# Patient Record
Sex: MALE | URBAN - METROPOLITAN AREA
[De-identification: names, ages, dates, MRNs, and addresses within clinical notes are randomized per-mention and may not be internally consistent; named-entity substitution may affect disease eponyms.]

---

## 2018-09-07 ASSESSMENT — ENCOUNTER SYMPTOMS
BACK PAIN: 1
ARTHRALGIAS: 1
CONSTIPATION: 1
HEARTBURN: 1

## 2018-09-07 ASSESSMENT — ANXIETY QUESTIONNAIRES
7. FEELING AFRAID AS IF SOMETHING AWFUL MIGHT HAPPEN: NOT AT ALL
6. BECOMING EASILY ANNOYED OR IRRITABLE: NOT AT ALL
GAD7 TOTAL SCORE: 3
4. TROUBLE RELAXING: SEVERAL DAYS
GAD7 TOTAL SCORE: 3
GAD7 TOTAL SCORE: 3
1. FEELING NERVOUS, ANXIOUS, OR ON EDGE: SEVERAL DAYS
2. NOT BEING ABLE TO STOP OR CONTROL WORRYING: NOT AT ALL
7. FEELING AFRAID AS IF SOMETHING AWFUL MIGHT HAPPEN: NOT AT ALL
5. BEING SO RESTLESS THAT IT IS HARD TO SIT STILL: NOT AT ALL
3. WORRYING TOO MUCH ABOUT DIFFERENT THINGS: SEVERAL DAYS

## 2018-09-07 ASSESSMENT — PATIENT HEALTH QUESTIONNAIRE - PHQ9
10. IF YOU CHECKED OFF ANY PROBLEMS, HOW DIFFICULT HAVE THESE PROBLEMS MADE IT FOR YOU TO DO YOUR WORK, TAKE CARE OF THINGS AT HOME, OR GET ALONG WITH OTHER PEOPLE: NOT DIFFICULT AT ALL
SUM OF ALL RESPONSES TO PHQ QUESTIONS 1-9: 4
SUM OF ALL RESPONSES TO PHQ QUESTIONS 1-9: 4

## 2018-09-08 ASSESSMENT — PATIENT HEALTH QUESTIONNAIRE - PHQ9: SUM OF ALL RESPONSES TO PHQ QUESTIONS 1-9: 4

## 2018-09-08 ASSESSMENT — ANXIETY QUESTIONNAIRES: GAD7 TOTAL SCORE: 3

## 2018-09-11 ENCOUNTER — RADIANT APPOINTMENT (OUTPATIENT)
Dept: BONE DENSITY | Facility: CLINIC | Age: 43
End: 2018-09-11
Payer: COMMERCIAL

## 2018-09-11 ENCOUNTER — APPOINTMENT (OUTPATIENT)
Dept: INTERNAL MEDICINE | Facility: CLINIC | Age: 43
End: 2018-09-11
Payer: COMMERCIAL

## 2018-09-11 ENCOUNTER — OFFICE VISIT (OUTPATIENT)
Dept: OPHTHALMOLOGY | Facility: CLINIC | Age: 43
End: 2018-09-11
Payer: COMMERCIAL

## 2018-09-11 ENCOUNTER — OFFICE VISIT (OUTPATIENT)
Dept: GASTROENTEROLOGY | Facility: CLINIC | Age: 43
End: 2018-09-11
Payer: COMMERCIAL

## 2018-09-11 ENCOUNTER — OFFICE VISIT (OUTPATIENT)
Dept: AUDIOLOGY | Facility: CLINIC | Age: 43
End: 2018-09-11
Payer: COMMERCIAL

## 2018-09-11 ENCOUNTER — OFFICE VISIT (OUTPATIENT)
Dept: DERMATOLOGY | Facility: CLINIC | Age: 43
End: 2018-09-11
Payer: COMMERCIAL

## 2018-09-11 ENCOUNTER — OFFICE VISIT (OUTPATIENT)
Dept: INTERNAL MEDICINE | Facility: CLINIC | Age: 43
End: 2018-09-11
Payer: COMMERCIAL

## 2018-09-11 VITALS
BODY MASS INDEX: 23.41 KG/M2 | HEIGHT: 74 IN | DIASTOLIC BLOOD PRESSURE: 72 MMHG | SYSTOLIC BLOOD PRESSURE: 108 MMHG | OXYGEN SATURATION: 98 % | TEMPERATURE: 97.5 F | RESPIRATION RATE: 16 BRPM | HEART RATE: 71 BPM | WEIGHT: 182.4 LBS

## 2018-09-11 DIAGNOSIS — D22.9 BENIGN NEVUS OF SKIN: ICD-10-CM

## 2018-09-11 DIAGNOSIS — K21.9 GASTROESOPHAGEAL REFLUX DISEASE WITHOUT ESOPHAGITIS: ICD-10-CM

## 2018-09-11 DIAGNOSIS — Z71.3 NUTRITIONAL COUNSELING: Primary | ICD-10-CM

## 2018-09-11 DIAGNOSIS — Z00.00 VISIT FOR PREVENTIVE HEALTH EXAMINATION: Primary | ICD-10-CM

## 2018-09-11 DIAGNOSIS — Z00.00 ENCOUNTER FOR PREVENTIVE HEALTH EXAMINATION: ICD-10-CM

## 2018-09-11 DIAGNOSIS — Z01.10 EXAMINATION OF EARS AND HEARING: Primary | ICD-10-CM

## 2018-09-11 DIAGNOSIS — M85.9 LOW BONE DENSITY: ICD-10-CM

## 2018-09-11 DIAGNOSIS — Z23 NEED FOR TD VACCINE: ICD-10-CM

## 2018-09-11 DIAGNOSIS — Z98.890 HISTORY OF PHOTOREFRACTIVE KERATECTOMY: Primary | ICD-10-CM

## 2018-09-11 DIAGNOSIS — M54.5 LOW BACK PAIN, UNSPECIFIED BACK PAIN LATERALITY, UNSPECIFIED CHRONICITY, WITH SCIATICA PRESENCE UNSPECIFIED: ICD-10-CM

## 2018-09-11 DIAGNOSIS — E78.5 HYPERLIPIDEMIA, UNSPECIFIED HYPERLIPIDEMIA TYPE: ICD-10-CM

## 2018-09-11 DIAGNOSIS — Z00.00 VISIT FOR PREVENTIVE HEALTH EXAMINATION: ICD-10-CM

## 2018-09-11 DIAGNOSIS — Z00.00 ROUTINE GENERAL MEDICAL EXAMINATION AT A HEALTH CARE FACILITY: Primary | ICD-10-CM

## 2018-09-11 DIAGNOSIS — L91.8 ST (SKIN TAG): ICD-10-CM

## 2018-09-11 DIAGNOSIS — M25.511 RIGHT SHOULDER PAIN, UNSPECIFIED CHRONICITY: ICD-10-CM

## 2018-09-11 DIAGNOSIS — Z71.82 EXERCISE COUNSELING: Primary | ICD-10-CM

## 2018-09-11 DIAGNOSIS — D22.9 MULTIPLE BENIGN NEVI: Primary | ICD-10-CM

## 2018-09-11 LAB
ALBUMIN UR-MCNC: NEGATIVE MG/DL
ALP SERPL-CCNC: 91 U/L (ref 40–150)
ALT SERPL W P-5'-P-CCNC: 22 U/L (ref 0–70)
APPEARANCE UR: CLEAR
BACTERIA #/AREA URNS HPF: ABNORMAL /HPF
BASOPHILS # BLD AUTO: 0 10E9/L (ref 0–0.2)
BASOPHILS NFR BLD AUTO: 0.4 %
BILIRUB UR QL STRIP: NEGATIVE
CALCIUM SERPL-MCNC: 8.8 MG/DL (ref 8.5–10.1)
CHOLEST SERPL-MCNC: 153 MG/DL
COLOR UR AUTO: YELLOW
CREAT SERPL-MCNC: 1.01 MG/DL (ref 0.66–1.25)
DEPRECATED CALCIDIOL+CALCIFEROL SERPL-MC: 35 UG/L (ref 20–75)
DIFFERENTIAL METHOD BLD: NORMAL
EOSINOPHIL # BLD AUTO: 0.2 10E9/L (ref 0–0.7)
EOSINOPHIL NFR BLD AUTO: 3 %
ERYTHROCYTE [DISTWIDTH] IN BLOOD BY AUTOMATED COUNT: 12 % (ref 10–15)
EXPTIME-PRE: 6.22 SEC
FEF2575-%PRED-PRE: 95 %
FEF2575-PRE: 3.93 L/SEC
FEF2575-PRED: 4.1 L/SEC
FEFMAX-%PRED-PRE: 97 %
FEFMAX-PRE: 10.78 L/SEC
FEFMAX-PRED: 11.01 L/SEC
FEV1-%PRED-PRE: 108 %
FEV1-PRE: 4.7 L
FEV1FEV6-PRE: 77 %
FEV1FEV6-PRED: 81 %
FEV1FVC-PRE: 77 %
FEV1FVC-PRED: 79 %
FIFMAX-PRE: 8.56 L/SEC
FVC-%PRED-PRE: 111 %
FVC-PRE: 6.07 L
FVC-PRED: 5.45 L
GFR SERPL CREATININE-BSD FRML MDRD: 80 ML/MIN/1.7M2
GLUCOSE SERPL-MCNC: 93 MG/DL (ref 70–99)
GLUCOSE UR STRIP-MCNC: NEGATIVE MG/DL
HCT VFR BLD AUTO: 44.9 % (ref 40–53)
HDLC SERPL-MCNC: 35 MG/DL
HGB BLD-MCNC: 15.1 G/DL (ref 13.3–17.7)
HGB UR QL STRIP: NEGATIVE
HIV 1+2 AB+HIV1 P24 AG SERPL QL IA: NONREACTIVE
IMM GRANULOCYTES # BLD: 0 10E9/L (ref 0–0.4)
IMM GRANULOCYTES NFR BLD: 0.2 %
KETONES UR STRIP-MCNC: NEGATIVE MG/DL
LDLC SERPL CALC-MCNC: 90 MG/DL
LEUKOCYTE ESTERASE UR QL STRIP: NEGATIVE
LYMPHOCYTES # BLD AUTO: 1.6 10E9/L (ref 0.8–5.3)
LYMPHOCYTES NFR BLD AUTO: 30.5 %
MCH RBC QN AUTO: 29.4 PG (ref 26.5–33)
MCHC RBC AUTO-ENTMCNC: 33.6 G/DL (ref 31.5–36.5)
MCV RBC AUTO: 87 FL (ref 78–100)
MONOCYTES # BLD AUTO: 0.3 10E9/L (ref 0–1.3)
MONOCYTES NFR BLD AUTO: 6.1 %
MUCOUS THREADS #/AREA URNS LPF: PRESENT /LPF
NEUTROPHILS # BLD AUTO: 3.2 10E9/L (ref 1.6–8.3)
NEUTROPHILS NFR BLD AUTO: 59.8 %
NITRATE UR QL: NEGATIVE
NONHDLC SERPL-MCNC: 118 MG/DL
NRBC # BLD AUTO: 0 10*3/UL
NRBC BLD AUTO-RTO: 0 /100
PH UR STRIP: 5 PH (ref 5–7)
PLATELET # BLD AUTO: 161 10E9/L (ref 150–450)
PSA SERPL-ACNC: 0.96 UG/L (ref 0–4)
RBC # BLD AUTO: 5.14 10E12/L (ref 4.4–5.9)
RBC #/AREA URNS AUTO: <1 /HPF (ref 0–2)
SOURCE: ABNORMAL
SP GR UR STRIP: 1.02 (ref 1–1.03)
SQUAMOUS #/AREA URNS AUTO: <1 /HPF (ref 0–1)
TRIGL SERPL-MCNC: 139 MG/DL
TSH SERPL DL<=0.005 MIU/L-ACNC: 1.23 MU/L (ref 0.4–4)
UROBILINOGEN UR STRIP-MCNC: 0 MG/DL (ref 0–2)
WBC # BLD AUTO: 5.4 10E9/L (ref 4–11)
WBC #/AREA URNS AUTO: 1 /HPF (ref 0–5)

## 2018-09-11 ASSESSMENT — REFRACTION_MANIFEST
OS_CYLINDER: +0.25
OD_SPHERE: PLANO
OS_SPHERE: +0.50
OS_ADD: +1.00
OS_CYLINDER: SPHERE
OD_ADD: +1.00
OD_CYLINDER: SPHERE
OS_SPHERE: PLANO
OS_AXIS: 180
OD_SPHERE: PLANO
OD_CYLINDER: SPHERE

## 2018-09-11 ASSESSMENT — PAIN SCALES - GENERAL
PAINLEVEL: NO PAIN (0)
PAINLEVEL: NO PAIN (0)

## 2018-09-11 ASSESSMENT — VISUAL ACUITY
OD_SC: 20/20
METHOD_MR_RETINOSCOPY: 1
METHOD: SNELLEN - LINEAR
OS_SC: 20/20

## 2018-09-11 ASSESSMENT — CONF VISUAL FIELD
OS_NORMAL: 1
OD_NORMAL: 1

## 2018-09-11 ASSESSMENT — TONOMETRY
OS_IOP_MMHG: 10
OD_IOP_MMHG: 12
IOP_METHOD: ICARE

## 2018-09-11 ASSESSMENT — CUP TO DISC RATIO
OD_RATIO: 0.35
OS_RATIO: 0.4

## 2018-09-11 ASSESSMENT — SLIT LAMP EXAM - LIDS
COMMENTS: NORMAL
COMMENTS: NORMAL

## 2018-09-11 ASSESSMENT — EXTERNAL EXAM - RIGHT EYE: OD_EXAM: NORMAL

## 2018-09-11 ASSESSMENT — EXTERNAL EXAM - LEFT EYE: OS_EXAM: NORMAL

## 2018-09-11 NOTE — LETTER
Date:September 12, 2018      Patient was self referred, no letter generated. Do not send.        [facility] Information

## 2018-09-11 NOTE — NURSING NOTE
Chief Complaint   Patient presents with     Physical     Patient is here for annual physical     Maite Doty CMA 6:55 AM on 9/11/2018.      AHA BP    1st   112/74  2nd   112/77  3rd    108/72  Average  111/75  Maite Doty CMA 7:15 AM on 9/11/2018.

## 2018-09-11 NOTE — NURSING NOTE
Dermatology Rooming Note    Jj Breen's goals for this visit include:   Chief Complaint   Patient presents with     Skin Check     Jj is here today for a skin check- has some spots of concern.      Emy Siegel MA

## 2018-09-11 NOTE — PROGRESS NOTES
Physical Fitness Assessment:    See Fitness Action Plan for information. I will follow up with Jj about resistance training exercises.    Gilmar Garcia, PhD  Exercise Physiologist  Fitness

## 2018-09-11 NOTE — LETTER
"9/11/2018      RE: Jj Breen  [address] [facility] [address]        History and Physical Examination     SUBJECTIVE: Chief complaint: preventive health review.     Past Medical History:  1.  Allergic rhinitis  2.  GERD, with reported history of Vazquez's, resolved on subsequent EGDs  3.  Dyslipidemia  4.  Status post bilateral PRK eye surgery, 3/2011  5.  Status post right knee arthroscopic partial meniscectomy, 7/2013  6.  Status post left knee arthroscopic partial meniscectomy, 1/2015  7.  Status post right knee synthetic cartilage implant, 9/2017  8.  Intermittent lower back pain, with history of L5 disc \"bulge\" on MRI     Adverse Drug Reactions: None.     Current Medications:  Fluticasone, 1 spray to each nostril daily as needed  Omega-3 supplement, one capsule daily  Daily multivitamin supplement  Esomeprazole, 40 mg daily  Cetirizine, 10 mg daily as needed    Habits:  Tobacco: Never  Alcohol: None  Caffeine: 1 serving of Advocare (caffeine plus vitamin supplement) daily     Social History:  to Sarah and father of 2 daughters: Rubi, age 16, a physically active high school sophomore who enjoys motocross and downhill skiing, and Ramila, age 14, an  who enjoys volleyball and downhill skiing.  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  He enjoys family activities, and historically enjoyed biking, downhill skiing, walking, camping and hiking; since his knee surgery approximately one year ago, his activity levels have dropped significantly, due in part to a busy work travel schedule.  Currently he walks for 20 minutes, twice per week.    Family History: Mother is 68, with stable multiple sclerosis, diagnosed in her 30s.  Father is 68, with history of testicular cancer, successfully treated in his 30s, and prostate cancer, diagnosed in his early 60s.  A brother is in good " "health at age 45.  Daughters are in good health.  Maternal grandmother  in her 80s, presumably from an embolic stroke related to atrial fibrillation; she was a breast cancer survivor.  Maternal grandfather  in his 80s from lung and stomach cancer, with history of extensive tobacco use.  Paternal grandmother  in her 80s from lung cancer, with history of extensive secondhand smoke exposure.  Paternal grandfather  in his 60s from tobacco-related lung cancer.     Review of Systems: Persistent right knee pain, slowly improving over the past year following cartilage implant procedure.  Recent right greater than left shoulder discomfort, initially after beginning an exercise regimen that included shoulder \"dips\"; symptoms currently do not limit his activity.  Intermittent lower back and right lower extremity discomfort, recently described as right groin paresthesias, reviewed by prolonged periods of standing or sitting and resolved with change in position.  Symptoms seemed to improve with chiropractic maneuvers.  He denies unexplained fever, weight loss, leg weakness, difficulty with urination, and unrelenting nocturnal pain.  Known history of L5 disc \"bulge\".  Long-standing mild transient lightheadedness exclusively after rising quickly from bed; no history of palpitations, syncope, or associated symptoms.  Mild to moderate work-related stress, managed conservatively using techniques learned in a recent week-long work seminar. Rare episodes of heartburn, exclusively related to dietary indiscretion.  He reports that an endoscopy in spring 2017 was\" negative\"; 5-year follow-up was recommended at that time.  Most recent tetanus (Tdap) was administered in 2009.  No history of colonoscopy.  Remainder of complete review of systems was negative.    OBJECTIVE:     Vital signs: Height 74.25 inches.  Weight 182.5 pounds.  Blood pressure 111/75 on average of 3 automated readings.  Heart rate 71.  Respiratory rate " 16.  Temperature 97.5 degrees.  O2 saturation 98% on room air.  General: Alert, neatly dressed and groomed, in no acute distress.  HEENT: Atraumatic and normocephalic. Eyelids, pupils, and conjunctivae appeared normal. Lips, teeth and gums appear normal.  Oropharynx showed moist mucous membranes, without exudate or erythema.  Neck: Supple, without thyromegaly, mass, or bruit. No cervical or supraclavicular lymphadenopathy.  Back: No spinal or costovertebral angle tenderness.  SLR was negative to 60  bilaterally.  Lower extremity sensory and motor examinations were normal.  Heel and toe walks were performed without difficulty.  Chest: Clear to auscultation and percussion. Normal respiratory effort.  Cardiovascular: No jugular venous distention. Regular rate and rhythm, normal S1, S2 without murmur.  Abdomen: Bowel sounds positive; soft, nontender, without rebound, guarding, hepatosplenomegaly or mass.  Extremities: No cyanosis or edema.  Examination of the left shoulder was normal.  Examination of the right shoulder showed normal range of motion, with no clavicle, AC joint, or bicipital groove tenderness.  Full active abduction performed without pain.  Pain was elicited in the region of the supraspinatus tendon with external rotation and adduction.  Normal rotator cuff strength.  Genitalia: Normal male genitalia, without scrotal mass or hernia. No inguinal lymphadenopathy.  Rectal: Normal tone, with smooth, nontender, minimally enlarged prostate. No rectal mass.  Skin: Examination was deferred; full evaluation was completed earlier in day through dermatology clinic.  Neurologic: Cranial nerves II-XII were grossly intact. Sensory and motor examinations were normal. Normal gait.  Mini-Cog score was 5/5.  Psychiatric: Alert and oriented ×3. Normal affect. Judgment and insight intact.    Creatinine 1.01, calcium 8.8, alkaline phosphatase 91, ALT 22, cholesterol 153, HDL 35, LDL 90, triglycerides 139, cholesterol/HDL 4.3,  "PSA 0.96, TSH 1.23, 25-hydroxy vitamin D 35, glucose 93, white blood cell count 5400, hemoglobin 15.1, platelets 161,000 HIV nonreactive, urinalysis unremarkable.  Testosterone level pending.    EKG was unremarkable.  Spirometry showed an FEV1 of 4.70, with an FVC of 6.07; readings were 108% and 111% of predicted values, respectively.    DEXA showed normal bone density, with most negative and valid Z-score of -0.9 at the level of the right total hip.  Body composition analysis showed 27.0% fat (85th percentile); body mass index was 23.4.     ASSESSMENT:    1.  GERD.  Rare breakthrough symptoms with use of use of esomeprazole.  We reviewed the importance of pursuing further evaluation in the event of progressive symptoms.  I recommended regular GI follow-up even if recommendation is that EGD will not be due until 2022.    2.  Lower back pain.  Intermittent lower extremity symptoms, typically triggered by prolonged periods of standing or sitting.  He denies \"warning symptoms\", which we reviewed in detail.  Symptoms have been stable and have not interfered with usual activities.  He agrees to pursue further evaluation in the event of progressive discomfort, or if any \"warning symptoms\" are noted.    3.  Shoulder discomfort.  Current left shoulder examination is unremarkable.  Right shoulder examination is suggestive of mild supraspinatus tendinitis.  No evidence of rotator cuff tear or nerve impingement.  He will engage in home range of motion exercises and consider physical therapy evaluation and treatment if symptoms do not subside over the next month.    4.  Dyslipidemia.  HDL has fallen to 35 from previous levels as high as 42.  I suspect that his reduced level of activity have played a role.  We discussed the importance of regular exercise, maintenance of ideal weight, and the avoidance of \"trans-\" fats and inclusion of monounsaturated and omega-3 fats in his diet.  Current estimated 10-year risk of a vascular " "event is 1.2% using AHA/ACC algorithm from 2013 guidelines.    5.  Preventive care.  Tetanus (Td) vaccination was provided at the time of his appointment, bringing his immunization status up-to-date.  He is practicing recently-acquired stress management techniques with success.  We discussed the potential effect of omega-3 supplements on prostate tissue.  I recommended daily use of a 2000-international unit vitamin D3 supplement, along with daily calcium intake of 1200 mg, preferably from dietary sources.  He was advised to use only a product that does not contain iron should he choose to continue using a multivitamin supplement.  We discussed the importance of additional activity and exercise and strategies to incorporate activity into his disease schedule.  We reviewed the elements of a healthful diet, focusing on avoiding sugars and starches, saturated and \"trans-\" fats, while increasing his intake of monounsaturated and omega-3 fats.  He was advised to maintain ideal weight, while having to increase muscle mass and reduce body fat percentage.    PLAN: See above.     Please note that the above medical document was created with use of speech recognition software and may contain typographical errors.      Darwin Murcia MD      "

## 2018-09-11 NOTE — LETTER
"9/11/2018       RE: Jj Breen  [address] [facility] [address]     Dear Colleague,    Thank you for referring your patient, Jj Breen, to the [facility] at [facility]. Please see a copy of my visit note below.     History and Physical Examination     SUBJECTIVE: Chief complaint: preventive health review.     Past Medical History:  1.  Allergic rhinitis  2.  GERD, with reported history of Vazquez's, resolved on subsequent EGDs  3.  Dyslipidemia  4.  Status post bilateral PRK eye surgery, 3/2011  5.  Status post right knee arthroscopic partial meniscectomy, 7/2013  6.  Status post left knee arthroscopic partial meniscectomy, 1/2015  7.  Status post right knee synthetic cartilage implant, 9/2017  8.  Intermittent lower back pain, with history of L5 disc \"bulge\" on MRI     Adverse Drug Reactions: None.     Current Medications:  Fluticasone, 1 spray to each nostril daily as needed  Omega-3 supplement, one capsule daily  Daily multivitamin supplement  Esomeprazole, 40 mg daily  Cetirizine, 10 mg daily as needed    Habits:  Tobacco: Never  Alcohol: None  Caffeine: 1 serving of Advocare (caffeine plus vitamin supplement) daily     Social History:  to Sarah and father of 2 daughters: Rubi, age 16, a physically active high school sophomore who enjoys motocross and downhill skiing, and Ramila, age 14, an  who enjoys volleyball and downhill skiing.  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  He enjoys family activities, and historically enjoyed biking, downhill skiing, walking, camping and hiking; since his knee surgery approximately one year ago, his activity levels have dropped significantly, due in part to a busy work travel schedule.  Currently he walks for 20 minutes, twice per week.    Family History: Mother is " "68, with stable multiple sclerosis, diagnosed in her 30s.  Father is 68, with history of testicular cancer, successfully treated in his 30s, and prostate cancer, diagnosed in his early 60s.  A brother is in good health at age 45.  Daughters are in good health.  Maternal grandmother  in her 80s, presumably from an embolic stroke related to atrial fibrillation; she was a breast cancer survivor.  Maternal grandfather  in his 80s from lung and stomach cancer, with history of extensive tobacco use.  Paternal grandmother  in her 80s from lung cancer, with history of extensive secondhand smoke exposure.  Paternal grandfather  in his 60s from tobacco-related lung cancer.     Review of Systems: Persistent right knee pain, slowly improving over the past year following cartilage implant procedure.  Recent right greater than left shoulder discomfort, initially after beginning an exercise regimen that included shoulder \"dips\"; symptoms currently do not limit his activity.  Intermittent lower back and right lower extremity discomfort, recently described as right groin paresthesias, reviewed by prolonged periods of standing or sitting and resolved with change in position.  Symptoms seemed to improve with chiropractic maneuvers.  He denies unexplained fever, weight loss, leg weakness, difficulty with urination, and unrelenting nocturnal pain.  Known history of L5 disc \"bulge\".  Long-standing mild transient lightheadedness exclusively after rising quickly from bed; no history of palpitations, syncope, or associated symptoms.  Mild to moderate work-related stress, managed conservatively using techniques learned in a recent week-long work seminar. Rare episodes of heartburn, exclusively related to dietary indiscretion.  He reports that an endoscopy in spring 2017 was\" negative\"; 5-year follow-up was recommended at that time.  Most recent tetanus (Tdap) was administered in 2009.  No history of colonoscopy.  Remainder " of complete review of systems was negative.    OBJECTIVE:     Vital signs: Height 74.25 inches.  Weight 182.5 pounds.  Blood pressure 111/75 on average of 3 automated readings.  Heart rate 71.  Respiratory rate 16.  Temperature 97.5 degrees.  O2 saturation 98% on room air.  General: Alert, neatly dressed and groomed, in no acute distress.  HEENT: Atraumatic and normocephalic. Eyelids, pupils, and conjunctivae appeared normal. Lips, teeth and gums appear normal.  Oropharynx showed moist mucous membranes, without exudate or erythema.  Neck: Supple, without thyromegaly, mass, or bruit. No cervical or supraclavicular lymphadenopathy.  Back: No spinal or costovertebral angle tenderness.  SLR was negative to 60  bilaterally.  Lower extremity sensory and motor examinations were normal.  Heel and toe walks were performed without difficulty.  Chest: Clear to auscultation and percussion. Normal respiratory effort.  Cardiovascular: No jugular venous distention. Regular rate and rhythm, normal S1, S2 without murmur.  Abdomen: Bowel sounds positive; soft, nontender, without rebound, guarding, hepatosplenomegaly or mass.  Extremities: No cyanosis or edema.  Examination of the left shoulder was normal.  Examination of the right shoulder showed normal range of motion, with no clavicle, AC joint, or bicipital groove tenderness.  Full active abduction performed without pain.  Pain was elicited in the region of the supraspinatus tendon with external rotation and adduction.  Normal rotator cuff strength.  Genitalia: Normal male genitalia, without scrotal mass or hernia. No inguinal lymphadenopathy.  Rectal: Normal tone, with smooth, nontender, minimally enlarged prostate. No rectal mass.  Skin: Examination was deferred; full evaluation was completed earlier in day through dermatology clinic.  Neurologic: Cranial nerves II-XII were grossly intact. Sensory and motor examinations were normal. Normal gait.  Mini-Cog score was  "5/5.  Psychiatric: Alert and oriented ×3. Normal affect. Judgment and insight intact.    Creatinine 1.01, calcium 8.8, alkaline phosphatase 91, ALT 22, cholesterol 153, HDL 35, LDL 90, triglycerides 139, cholesterol/HDL 4.3, PSA 0.96, TSH 1.23, 25-hydroxy vitamin D 35, glucose 93, white blood cell count 5400, hemoglobin 15.1, platelets 161,000 HIV nonreactive, urinalysis unremarkable.  Testosterone level pending.    EKG was unremarkable.  Spirometry showed an FEV1 of 4.70, with an FVC of 6.07; readings were 108% and 111% of predicted values, respectively.    DEXA showed normal bone density, with most negative and valid Z-score of -0.9 at the level of the right total hip.  Body composition analysis showed 27.0% fat (85th percentile); body mass index was 23.4.     ASSESSMENT:    1.  GERD.  Rare breakthrough symptoms with use of use of esomeprazole.  We reviewed the importance of pursuing further evaluation in the event of progressive symptoms.  I recommended regular GI follow-up even if recommendation is that EGD will not be due until 2022.    2.  Lower back pain.  Intermittent lower extremity symptoms, typically triggered by prolonged periods of standing or sitting.  He denies \"warning symptoms\", which we reviewed in detail.  Symptoms have been stable and have not interfered with usual activities.  He agrees to pursue further evaluation in the event of progressive discomfort, or if any \"warning symptoms\" are noted.    3.  Shoulder discomfort.  Current left shoulder examination is unremarkable.  Right shoulder examination is suggestive of mild supraspinatus tendinitis.  No evidence of rotator cuff tear or nerve impingement.  He will engage in home range of motion exercises and consider physical therapy evaluation and treatment if symptoms do not subside over the next month.    4.  Dyslipidemia.  HDL has fallen to 35 from previous levels as high as 42.  I suspect that his reduced level of activity have played a role.  " "We discussed the importance of regular exercise, maintenance of ideal weight, and the avoidance of \"trans-\" fats and inclusion of monounsaturated and omega-3 fats in his diet.  Current estimated 10-year risk of a vascular event is 1.2% using AHA/ACC algorithm from 2013 guidelines.    5.  Preventive care.  Tetanus (Td) vaccination was provided at the time of his appointment, bringing his immunization status up-to-date.  He is practicing recently-acquired stress management techniques with success.  We discussed the potential effect of omega-3 supplements on prostate tissue.  I recommended daily use of a 2000-international unit vitamin D3 supplement, along with daily calcium intake of 1200 mg, preferably from dietary sources.  He was advised to use only a product that does not contain iron should he choose to continue using a multivitamin supplement.  We discussed the importance of additional activity and exercise and strategies to incorporate activity into his disease schedule.  We reviewed the elements of a healthful diet, focusing on avoiding sugars and starches, saturated and \"trans-\" fats, while increasing his intake of monounsaturated and omega-3 fats.  He was advised to maintain ideal weight, while having to increase muscle mass and reduce body fat percentage.    PLAN: See above.     Please note that the above medical document was created with use of speech recognition software and may contain typographical errors.      Again, thank you for allowing me to participate in the care of your patient.      Sincerely,    Darwin Murcia MD      "

## 2018-09-11 NOTE — PROGRESS NOTES
Chief Complaints and History of Present Illnesses   Patient presents with     COMPREHENSIVE EYE EXAM     Here for exam  History of Photorefractive keratectomy (PRK) 3-4 years ago in [place]  Been pleased with refractive outcome  Has been a bit more light sensitive since  Denies Diabetes mellitus and glucose was 93 this AM.      Assessment & Plan     Jj Breen is a 43 year old male with the following diagnoses:   1. History of photorefractive keratectomy       F/u 1-2 years for routine eye exam.          Attending Physician Attestation:  Complete documentation of historical and exam elements from today's encounter can be found in the full encounter summary report (not reduplicated in this progress note).  I personally obtained the chief complaint(s) and history of present illness.  I confirmed and edited as necessary the review of systems, past medical/surgical history, family history, social history, and examination findings as documented by others; and I examined the patient myself.  I personally reviewed the relevant tests, images, and reports as documented above.  I formulated and edited as necessary the assessment and plan and discussed the findings and management plan with the patient and family. - Jojo Vasquez MD

## 2018-09-11 NOTE — MR AVS SNAPSHOT
After Visit Summary   9/11/2018    Jj Breen    MRN: [id number]           Patient Information     Date Of Birth          1975        Visit Information        Provider Department      9/11/2018 1:00 PM Isabel Duffy PA-C [facility] Dermatology        Today's Diagnoses     Multiple benign nevi    -  1      Care Instructions                    Follow-ups after your visit        Your next 10 appointments already scheduled     Sep 11, 2018  1:30 PM CDT   DX HIP/PELVIS/SPINE with UCDX1   [facility] ([facility])    [address]  1st Floor  [place] [facility] [address] [phone]           Please do not take any of the following 24 hours prior to the day of your exam: vitamins, calcium tablets, antacids.  If possible, please wear clothes without metal (snaps, zippers). A sweatsuit works well.            Sep 11, 2018  2:00 PM CDT   (Arrive by 1:45 PM)   Signature Base Assessment with Darwin Murcia MD   [facility] ([facility])    [address]  5th Floor  [place] [facility] [address] [phone]              Who to contact     Please call your clinic at [phone] to:    Ask questions about your health    Make or cancel appointments    Discuss your medicines    Learn about your test results    Speak to your doctor            Additional Information About Your Visit        MyChart Information     [employer] is an electronic gateway that provides easy, online access to your medical records. With [employer], you can request a clinic appointment, read your test results, renew a prescription or communicate with your care team.     To sign up for [employer] visit the website at [email or web address]   You will be asked to enter the access code listed below, as well as some personal information. Please follow the directions to create your username and password.     Your access code is:  [email or web address]  Expires: 2018 10:39 AM     Your access code will  in 90 days. If you need help or a new code, please contact your [facility] or call [phone] for assistance.        Care EveryWhere ID     This is your Care EveryWhere ID. This could be used by other organizations to access your [facility] medical records  [id number]         Blood Pressure from Last 3 Encounters:   No data found for BP    Weight from Last 3 Encounters:   No data found for Wt              Today, you had the following     No orders found for display       Primary Care Provider    None Specified       No primary provider on file.        Equal Access to Services     [facility]: Hadii aad mike hadolga Soni, wabrennanda madiha, jc kaalmada mary, calos schilling . So [facility] [phone].    ATENCIÓN: Si habla español, tiene a larkin disposición servicios gratuitos de asistencia lingüística. Llame al [phone].    We comply with applicable federal civil rights laws and [place] laws. We do not discriminate on the basis of race, color, national origin, age, disability, sex, sexual orientation, or gender identity.            Thank you!     Thank you for choosing [facility]  for your care. Our goal is always to provide you with excellent care. Hearing back from our patients is one way we can continue to improve our services. Please take a few minutes to complete the written survey that you may receive in the mail after your visit with us. Thank you!             Your Updated Medication List - Protect others around you: Learn how to safely use, store and throw away your medicines at www.disposemymeds.org.          This list is accurate as of 18  1:22 PM.  Always use your most recent med list.                   Brand Name Dispense Instructions for use Diagnosis    * MULTIVITAL PO      Take by mouth daily        * MULTIVITAMIN & MINERAL PO      Take by mouth  daily        NEXIUM PO      Take by mouth daily        OMEGA 3 PO      Take by mouth daily        PROBIOTIC FORMULA PO           * Notice:  This list has 2 medication(s) that are the same as other medications prescribed for you. Read the directions carefully, and ask your doctor or other care provider to review them with you.

## 2018-09-11 NOTE — PROGRESS NOTES
"[facility] Outpatient Medical Nutrition Therapy      Time Spent:  45 minutes  Session Type:  Initial Individual Session  Referral Source: [employer] Package  Reason for RD Visit:   Nutritional counseling     Nutrition Assessment:  Patient is here for [facility] visit with Registered Dietitian (ANAI).  Patient is a 43 y.o. male with history of GERD and recent hx right knee implant. Patient stated that a couple of months ago he tried to follow a vegan and gluten free diet after watching a documentary called \"What the health.\" He followed a vegan diet for a couple of weeks strictly but found that within the first week of following this diet, he lost 9 lbs due to reduced intake and cutting sugar out of his diet as well. He does not like raw vegetables, so would order a salad and tofu but not each much of it due to his food preferences. Prior to making dietary changes, he was drinking at least two sweetened caffeinated drinks such as Mountain dew, monster energy drinks, but has not had them in the past 2 months. Due to losing weight and limited intake, he decided to add back in some fish, egg whites, chicken. In general, he does not eat much dairy due to feels he has some intolerance to dairy, and limits red meat intake. He likes sugar, but over the past couple of months has limited intake. He reported that his wife feels that she has a gluten intolerance so follows a gluten free diet. Patient stated that he travels a lot either travels by car or airplane usually Monday through Friday. Eats a light breakfast. Tends to go through a drive through/take out for lunch meals and 50% of the time eats while driving and the other 50% stops to eat. When home eats a homemade dinner meal but while away goes to a sit down restaurant for dinner. Has been using Plexus slim flavor packets in water and Advocare Spark energy flavor powder in water (120 mg of caffeine + vitamins). He stated that he has a history of GERD but " "takes nexium. He reported that with this medication he does not have issues, but if he does not take this medication, then will have GERD symptoms. He also reported that does have some infrequent bowel movements so since using these energy flavor packets/slim flavor packets, he feels this is helpful in having more regular/frequent bowel movements. He also noticed that drinking occasional mocha also helps.    Height:   Ht Readings from Last 1 Encounters:   09/11/18 1.886 m (6' 2.25\")     Weight:    Wt Readings from Last 1 Encounters:   09/11/18 82.7 kg (182 lb 6.4 oz)       BMI: 23.31    Diet Recall:  (usual intake)  Meal Food    Breakfast Kind breakfast bar/granola bar   Lunch Chik kiel: grilled chicken sandwich with fruit or with super green salad OR sushi   Dinner Homemade meal of grilled chicken/shrimp with rice and broccoli OR 2 eggs with a slice of cheese and spinach/mushrooms OR if out of town for work: grilled fish, rice, vegetables   Snacks ~3 snacks per day: Kind bar/granola bar/apple/banana   Beverages 60-70 oz water (adds some energy flavor packets to some water), occasional starbuck's mocha   Alcohol Intake none     Frequency of eating/taking out meals: during week will eat/take out for lunch and dinner meal while traveling.     Labs:  Reviewed  Pertinent Medications/vitamin and mineral supplements:   Pt reported that he takes a multivitamin, nexium, juice plus tablet, and cod liver oil supplement. Additionally, uses 2 Advocare Spark energy packets added to water (has vitamins, caffeine and vitamins) and 1 Plexus slim packet to water.  Food Allergies:  NKFA  Physical Activity:  Pt reported that he had knee surgery recently so had limited activity. He reported that he is now able to exercise again but has gotten out of the routine. Did meet with exercise specialist earlier today and reported created a plan with her to start increasing exercise.    Nutrition Diagnosis:    Food and nutrition related " knowledge deficit related to lack of previous/lack of complete recall of previous diet education as evidenced by pt report and interest in diet education/review.    Nutrition Prescription: Recommended general healthful diet     Nutrition Intervention:    Nutrition Education/Counseling:  Provided general healthful diet nutrition education with tips and suggestions. Due to patient's interest in more less dairy, red meat and aim for eating more fish/chicken/lower sugar diet, discussed general healthful mediterranean diet recommendations. Due to pt with hx of GERD, reviewed diet education with tips and suggestions for GERD including recommended foods (as tolerated) and foods not recommended if causing symptoms such as chocolate, caffeine, peppermint and spearmint, alcohol, spicy foods and fatty foods as well as recommendation not to smoke. Provided lowfat diet tips. Encouraged patient to keep a food journal and write down all food and beverages consumed, time consumed and track any GI symptoms. Encouraged drinking adequate fluids and recommended pt continue to drink at least 64 oz per day.    Discussed some lifestyle recommendations that may be helpful for relieving issues with GERD such as getting up and moving/walking after eating meals, can raise the head of the bed by using a foam wedge under top part of mattress for example. Told patient that wearing loose fitting clothing may be more comfortable, and eating slowly, sitting while eating and eating in a calm and relaxed place is recommended. Additionally recommended not eating 2-3 hours before bedtime or lying down. Gave patient diet education handout.    Answered patient's questions. he verbalized understanding of education provided.     Educational Materials Provided:  [facility] weight maintenance handout and Nutrition care manual: Mediterranean nutrition therapy and GERD nutrition therapy.     Patient verbalized understanding of education provided. Provided pt  with RD contact information and list of goals below.     Goals:  1. Increase vegetable and fruit servings. Include at least 1-2 vegetable servings and/or at least 1 vegetable and 1 fruit serving at each meal.    2. Eat a more balanced breakfast meal (include a leaner protein source such as egg whites and a fruit serving along with your Kind breakfast bar for example).    3. Okay to replace a meal with a protein drink if unable to eat a meal/have a healthy meal while driving between states for work.      -Aim for a protein drink that has at least 20 grams of protein (whey or soy based protein  powder recommended. Do not recommend pea protein or rice protein).    Some example protein drinks include: (Most if not all are available in the pharmacy section at [employer]):  Premier Protein (160 Calories, 30 g protein)  Slim Fast Advanced Nutrition (180 Calories, 20 g protein)  Muscle Milk, lactose-free, 17 oz bottle (210 Calories, 30 g protein)  Integrated Supplements, no artificial sugars (110 Calories, 20 g protein)  Quest Protein Bars (190 Calories, 20 g protein)  No Cow Protein Bar, gluten, dairy, and soy free (200 Calories, 20 g protein)  Also pure protein (ready to drink bottles of protein drinks, protein powder and bars)    4. Continue drinking at least 64 oz water/fluids per day.    Nutrition Monitoring and Evaluation: Will monitor adherence to nutrition recommendations at future RD visits.     Further Medical Nutrition Therapy:  PRN  Patient was encouraged to call/contact RD with any further questions.    Kelly Temple, MS, RD, LD

## 2018-09-11 NOTE — PROGRESS NOTES
AUDIOLOGY REPORT  Signature Health Base Assessment    SUMMARY: Audiology visit completed. See audiogram for results.      RECOMMENDATIONS: Follow-up with Dr. Murcia. Repeat hearing evaluation as medically indicated, sooner if concerns arise.      Tom Damon  Audiologist  [place] License  #[id number]

## 2018-09-11 NOTE — LETTER
"9/11/2018       RE: Jj Breen  [address] [place] [address]     Dear Colleague,    Thank you for referring your patient, Jj Breen, to the [facility] GASTROENTEROLOGY AND IBD CLINIC at [facility]. Please see a copy of my visit note below.    [facility] Outpatient Medical Nutrition Therapy      Time Spent:  45 minutes  Session Type:  Initial Individual Session  Referral Source: [facility] [employer] Package  Reason for RD Visit:   Nutritional counseling     Nutrition Assessment:  Patient is here for [facility] visit with Registered Dietitian (ANAI).  Patient is a 43 y.o. male with history of GERD and recent hx right knee implant. Patient stated that a couple of months ago he tried to follow a vegan and gluten free diet after watching a documentary called \"What the health.\" He followed a vegan diet for a couple of weeks strictly but found that within the first week of following this diet, he lost 9 lbs due to reduced intake and cutting sugar out of his diet as well. He does not like raw vegetables, so would order a salad and tofu but not each much of it due to his food preferences. Prior to making dietary changes, he was drinking at least two sweetened caffeinated drinks such as Mountain dew, monster energy drinks, but has not had them in the past 2 months. Due to losing weight and limited intake, he decided to add back in some fish, egg whites, chicken. In general, he does not eat much dairy due to feels he has some intolerance to dairy, and limits red meat intake. He likes sugar, but over the past couple of months has limited intake. He reported that his wife feels that she has a gluten intolerance so follows a gluten free diet. Patient stated that he travels a lot either travels by car or airplane usually Monday through Friday. Eats a light breakfast. Tends to go through a drive through/take out for lunch meals and 50% of the time eats while driving and " "the other 50% stops to eat. When home eats a homemade dinner meal but while away goes to a sit down restaurant for dinner. Has been using Plexus slim flavor packets in water and Advocare Spark energy flavor powder in water (120 mg of caffeine + vitamins). He stated that he has a history of GERD but takes nexium. He reported that with this medication he does not have issues, but if he does not take this medication, then will have GERD symptoms. He also reported that does have some infrequent bowel movements so since using these energy flavor packets/slim flavor packets, he feels this is helpful in having more regular/frequent bowel movements. He also noticed that drinking occasional mocha also helps.    Height:   Ht Readings from Last 1 Encounters:   09/11/18 1.886 m (6' 2.25\")     Weight:    Wt Readings from Last 1 Encounters:   09/11/18 82.7 kg (182 lb 6.4 oz)       BMI: 23.31    Diet Recall:  (usual intake)  Meal Food    Breakfast Kind breakfast bar/granola bar   Lunch Chik kiel: grilled chicken sandwich with fruit or with super green salad OR sushi   Dinner Homemade meal of grilled chicken/shrimp with rice and broccoli OR 2 eggs with a slice of cheese and spinach/mushrooms OR if out of town for work: grilled fish, rice, vegetables   Snacks ~3 snacks per day: Kind bar/granola bar/apple/banana   Beverages 60-70 oz water (adds some energy flavor packets to some water), occasional starbuck's mocha   Alcohol Intake none     Frequency of eating/taking out meals: during week will eat/take out for lunch and dinner meal while traveling.     Labs:  Reviewed  Pertinent Medications/vitamin and mineral supplements:   Pt reported that he takes a multivitamin, nexium, juice plus tablet, and cod liver oil supplement. Additionally, uses 2 Advocare Spark energy packets added to water (has vitamins, caffeine and vitamins) and 1 Plexus slim packet to water.  Food Allergies:  NKFA  Physical Activity:  Pt reported that he had knee " surgery recently so had limited activity. He reported that he is now able to exercise again but has gotten out of the routine. Did meet with exercise specialist earlier today and reported created a plan with her to start increasing exercise.    Nutrition Diagnosis:    Food and nutrition related knowledge deficit related to lack of previous/lack of complete recall of previous diet education as evidenced by pt report and interest in diet education/review.    Nutrition Prescription: Recommended general healthful diet     Nutrition Intervention:    Nutrition Education/Counseling:  Provided general healthful diet nutrition education with tips and suggestions. Due to patient's interest in more less dairy, red meat and aim for eating more fish/chicken/lower sugar diet, discussed general healthful mediterranean diet recommendations. Due to pt with hx of GERD, reviewed diet education with tips and suggestions for GERD including recommended foods (as tolerated) and foods not recommended if causing symptoms such as chocolate, caffeine, peppermint and spearmint, alcohol, spicy foods and fatty foods as well as recommendation not to smoke. Provided lowfat diet tips. Encouraged patient to keep a food journal and write down all food and beverages consumed, time consumed and track any GI symptoms. Encouraged drinking adequate fluids and recommended pt continue to drink at least 64 oz per day.    Discussed some lifestyle recommendations that may be helpful for relieving issues with GERD such as getting up and moving/walking after eating meals, can raise the head of the bed by using a foam wedge under top part of mattress for example. Told patient that wearing loose fitting clothing may be more comfortable, and eating slowly, sitting while eating and eating in a calm and relaxed place is recommended. Additionally recommended not eating 2-3 hours before bedtime or lying down. Gave patient diet education handout.    Answered patient's  questions. he verbalized understanding of education provided.     Educational Materials Provided:   Health weight maintenance handout and Nutrition care manual: Mediterranean nutrition therapy and GERD nutrition therapy.     Patient verbalized understanding of education provided. Provided pt with RD contact information and list of goals below.     Goals:  1. Increase vegetable and fruit servings. Include at least 1-2 vegetable servings and/or at least 1 vegetable and 1 fruit serving at each meal.    2. Eat a more balanced breakfast meal (include a leaner protein source such as egg whites and a fruit serving along with your Kind breakfast bar for example).    3. Okay to replace a meal with a protein drink if unable to eat a meal/have a healthy meal while driving between states for work.      -Aim for a protein drink that has at least 20 grams of protein (whey or soy based protein  powder recommended. Do not recommend pea protein or rice protein).    Some example protein drinks include: (Most if not all are available in the pharmacy section at [employer]):  Premier Protein (160 Calories, 30 g protein)  Slim Fast Advanced Nutrition (180 Calories, 20 g protein)  Muscle Milk, lactose-free, 17 oz bottle (210 Calories, 30 g protein)  Integrated Supplements, no artificial sugars (110 Calories, 20 g protein)  Quest Protein Bars (190 Calories, 20 g protein)  No Cow Protein Bar, gluten, dairy, and soy free (200 Calories, 20 g protein)  Also pure protein (ready to drink bottles of protein drinks, protein powder and bars)    4. Continue drinking at least 64 oz water/fluids per day.    Nutrition Monitoring and Evaluation: Will monitor adherence to nutrition recommendations at future RD visits.     Further Medical Nutrition Therapy:  PRN  Patient was encouraged to call/contact RD with any further questions.      Again, thank you for allowing me to participate in the care of your patient.      Sincerely,    eKlly Temple RD

## 2018-09-11 NOTE — MR AVS SNAPSHOT
After Visit Summary   2018    Jj Breen    MRN: [id number]           Patient Information     Date Of Birth          1975        Visit Information        Provider Department      2018 11:30 AM Kelly Temple RD M [facility] Gastroenterology and IBD Clinic        Today's Diagnoses     Nutritional counseling    -  1       Follow-ups after your visit        Future tests that were ordered for you today     Open Future Orders        Priority Expected Expires Ordered    TD (ADULT, 7+) PRESERVE FREE Routine  2019            Who to contact     Please call your clinic at [phone] to:    Ask questions about your health    Make or cancel appointments    Discuss your medicines    Learn about your test results    Speak to your doctor            Additional Information About Your Visit        MyChart Information     [employer] is an electronic gateway that provides easy, online access to your medical records. With [employer], you can request a clinic appointment, read your test results, renew a prescription or communicate with your care team.     To sign up for [employer] visit the website at [email or web address]   You will be asked to enter the access code listed below, as well as some personal information. Please follow the directions to create your username and password.     Your access code is: [email or web address]  Expires: 2018 10:39 AM     Your access code will  in 90 days. If you need help or a new code, please contact your [facility] or call [phone] for assistance.        Care EveryWhere ID     This is your Care EveryWhere ID. This could be used by other organizations to access your [facility] medical records  [id number]         Blood Pressure from Last 3 Encounters:   18 108/72    Weight from Last 3 Encounters:   18 82.7 kg (182 lb 6.4 oz)              Today, you had the following     No orders found for display        Primary Care Provider    None Specified       No primary provider on file.        Equal Access to Services     ANGEL GAR : Hadii aad ku haddidiheraclio Gandhi, muriel cleary, jc packermacalos huertamarydanii so. So [facility] [phone].    ATENCIÓN: Si habla español, tiene a larkin disposición servicios gratuitos de asistencia lingüística. Llame al [phone].    We comply with applicable federal civil rights laws and [place] laws. We do not discriminate on the basis of race, color, national origin, age, disability, sex, sexual orientation, or gender identity.            Thank you!     Thank you for choosing [facility] GASTROENTEROLOGY AND IBD CLINIC  for your care. Our goal is always to provide you with excellent care. Hearing back from our patients is one way we can continue to improve our services. Please take a few minutes to complete the written survey that you may receive in the mail after your visit with us. Thank you!             Your Updated Medication List - Protect others around you: Learn how to safely use, store and throw away your medicines at www.disposemymeds.org.          This list is accurate as of 9/11/18  3:27 PM.  Always use your most recent med list.                   Brand Name Dispense Instructions for use Diagnosis    * MULTIVITAL PO      Take by mouth daily        * MULTIVITAMIN & MINERAL PO      Take by mouth daily        NEXIUM PO      Take by mouth daily        OMEGA 3 PO      Take by mouth daily        PROBIOTIC FORMULA PO           * Notice:  This list has 2 medication(s) that are the same as other medications prescribed for you. Read the directions carefully, and ask your doctor or other care provider to review them with you.

## 2018-09-11 NOTE — LETTER
9/11/2018     RE: Jj Breen  [address] [facility] [address]     Dear Colleague,    Thank you for referring your patient, Jj Breen, to the [facility] DERMATOLOGY at [facility]. Please see a copy of my visit note below.    [facility] Dermatology Note      Dermatology Problem List:  1. Blue nevus - following via photos  2. Benign nevi    CC:   Chief Complaint   Patient presents with     Skin Check     Jj is here today for a skin check- has some spots of concern.          Encounter Date: Sep 11, 2018    History of Present Illness:  Mr. Jj Breen is a 43 year old male who is new to the dermatology department who presents for a FBSE. He has one area of concern on the left side of the nose which has been there a few years. He does not often wear sunscreen he says. He  Says it has not changed recently and does not give him sx. He also noted an unchanged skin tag on the left lateral chest near the axilla. He has no fhx or personal hx of skin cancer. The patient denies painful, itching, tingling or bleeding lesions unless otherwise noted. Feeling well today.     Past Medical History:   There is no problem list on file for this patient.    Past Medical History:   Diagnosis Date     Gastroesophageal reflux disease     manage with OTC      Past Surgical History:   Procedure Laterality Date     ARTHROSCOPY KNEE WITH MENISCAL REPAIR Left 2014     ENHANCE LASER REFRACTIVE BILATERAL EXISTING PT IN PARAMETERS Bilateral 2013    PRK OU       Social History:  The patient for [employer]. The patient denies use of tanning beds. Lives in [place] and comes back for the national meeting.     Family History:  There is no family history of skin cancer. There is no family history of melanoma.    Medications:  Current Outpatient Prescriptions   Medication Sig Dispense Refill     Esomeprazole Magnesium (NEXIUM PO) Take by mouth daily       Multiple Vitamins-Minerals  (MULTIVITAL PO) Take by mouth daily       Multiple Vitamins-Minerals (MULTIVITAMIN & MINERAL PO) Take by mouth daily       Omega-3 Fatty Acids (OMEGA 3 PO) Take by mouth daily       Probiotic Product (PROBIOTIC FORMULA PO)        Allergies   Allergen Reactions     Pollen Extract Itching         Review of Systems:  -Constitutional: The patient denies fatigue, fevers, chills, unintended weight loss, and night sweats.  -Skin: As above in HPI. No additional skin concerns.    Physical exam:  Vitals: There were no vitals taken for this visit.  GEN: This is a well developed, well-nourished male in no acute distress, in a pleasant mood.    SKIN: Full skin, which includes the head/face, both arms, chest, back, abdomen,both legs, genitalia and/or groin buttocks, digits and/or nails, was examined.  -There is(are) skin colored pedunculated papules on the left axilla.   -Multiple regular brown pigmented macules and papules are identified on the trunk  -Thayer's skin type II.   -1mm blue appearing macule on the left nostril  -No other lesions of concern on areas examined.     Impression/Plan:  1. Nevus - likely blue nevus on th left nostril - 1mm in size, round and regular    Photograph was obtained for clinical monitoring and inclusion in medical record.    Edu that if he notices changes prior to next year's appointment he should have bx    May consider bx in future    2. Multiple clinically benign nevi on the trunk and extremities    Sunscreen: Apply 20 minutes prior to going outdoors and reapply every two hours, when wet or sweating. We recommend using an SPF 30 or higher, and to use one that is water resistant.       ABCD's of melanoma were reviewed with patient     3. Acrochordon, non irritated - left axilla    Reassured benign    CC Dr. Gonzales on close of this encounter.  Follow-up in 1 year, earlier for new or changing lesions.     Staff Involved:  Staff Only  All risks, benefits and alternatives were discussed  with patient.  Patient is in agreement and understands the assessment and plan.  All questions were answered.    Isabel Duffy PA-C  [facility]: Phone: [phone], Fax: [phone]

## 2018-09-11 NOTE — PROGRESS NOTES
Jj Breen comes into clinic today at the request of Dr. LARS Murcia Ordering Provider for EKG.    This service provided today was under the supervising provider of the day Dr. LARS Murcia, who was available if needed.    Maite Doty

## 2018-09-11 NOTE — PROGRESS NOTES
[facility] Dermatology Note      Dermatology Problem List:  1. Blue nevus - following via photos  2. Benign nevi    CC:   Chief Complaint   Patient presents with     Skin Check     Jj is here today for a skin check- has some spots of concern.          Encounter Date: Sep 11, 2018    History of Present Illness:  Mr. Jj Breen is a 43 year old male who is new to the dermatology department who presents for a FBSE. He has one area of concern on the left side of the nose which has been there a few years. He does not often wear sunscreen he says. He  Says it has not changed recently and does not give him sx. He also noted an unchanged skin tag on the left lateral chest near the axilla. He has no fhx or personal hx of skin cancer. The patient denies painful, itching, tingling or bleeding lesions unless otherwise noted. Feeling well today.     Past Medical History:   There is no problem list on file for this patient.    Past Medical History:   Diagnosis Date     Gastroesophageal reflux disease     manage with OTC      Past Surgical History:   Procedure Laterality Date     ARTHROSCOPY KNEE WITH MENISCAL REPAIR Left 2014     ENHANCE LASER REFRACTIVE BILATERAL EXISTING PT IN PARAMETERS Bilateral 2013    PRK OU       Social History:  The patient for [employer]. The patient denies use of tanning beds. Lives in [place] and comes back for the national meeting.     Family History:  There is no family history of skin cancer. There is no family history of melanoma.    Medications:  Current Outpatient Prescriptions   Medication Sig Dispense Refill     Esomeprazole Magnesium (NEXIUM PO) Take by mouth daily       Multiple Vitamins-Minerals (MULTIVITAL PO) Take by mouth daily       Multiple Vitamins-Minerals (MULTIVITAMIN & MINERAL PO) Take by mouth daily       Omega-3 Fatty Acids (OMEGA 3 PO) Take by mouth daily       Probiotic Product (PROBIOTIC FORMULA PO)        Allergies   Allergen Reactions     Pollen  Extract Itching         Review of Systems:  -Constitutional: The patient denies fatigue, fevers, chills, unintended weight loss, and night sweats.  -Skin: As above in HPI. No additional skin concerns.    Physical exam:  Vitals: There were no vitals taken for this visit.  GEN: This is a well developed, well-nourished male in no acute distress, in a pleasant mood.    SKIN: Full skin, which includes the head/face, both arms, chest, back, abdomen,both legs, genitalia and/or groin buttocks, digits and/or nails, was examined.  -There is(are) skin colored pedunculated papules on the left axilla.   -Multiple regular brown pigmented macules and papules are identified on the trunk  -Thayer's skin type II.   -1mm blue appearing macule on the left nostril  -No other lesions of concern on areas examined.     Impression/Plan:  1. Nevus - likely blue nevus on th left nostril - 1mm in size, round and regular    Photograph was obtained for clinical monitoring and inclusion in medical record.    Edu that if he notices changes prior to next year's appointment he should have bx    May consider bx in future    2. Multiple clinically benign nevi on the trunk and extremities    Sunscreen: Apply 20 minutes prior to going outdoors and reapply every two hours, when wet or sweating. We recommend using an SPF 30 or higher, and to use one that is water resistant.       ABCD's of melanoma were reviewed with patient     3. Acrochordon, non irritated - left axilla    Reassured benign    CC Dr. Gonzales on close of this encounter.  Follow-up in 1 year, earlier for new or changing lesions.       Staff Involved:  Staff Only  All risks, benefits and alternatives were discussed with patient.  Patient is in agreement and understands the assessment and plan.  All questions were answered.    Isabel Duffy PA-C  [facility]: Phone: [phone], Fax: [phone]

## 2018-09-11 NOTE — MR AVS SNAPSHOT
After Visit Summary   9/11/2018    Jj Breen    MRN: [id number]           Patient Information     Date Of Birth          1975        Visit Information        Provider Department      9/11/2018 10:30 AM Kymberly Jones AuD [facility] Audiology        Today's Diagnoses     Examination of ears and hearing    -  1       Follow-ups after your visit        Your next 10 appointments already scheduled     Sep 11, 2018 11:30 AM CDT   (Arrive by 11:15 AM)   New Nutrition with Kelly Temple RD   [facility] Gastroenterology and IBD Clinic ([facility])    [address] [place] [address]  4th [facility] [id number] [phone]            Sep 11, 2018  1:00 PM CDT   (Arrive by 12:45 PM)   Signature Base Assessment with Isabel Duffy PA-C   [facility] Dermatology ([facility])    [address] [place] [address]  3rd [facility] [id number] [phone]            Sep 11, 2018  1:30 PM CDT   DX HIP/PELVIS/SPINE with UCDX1   [facility] Imaging Center Dexa ([facility])    [address] [place] [address]  1st [facility] [id number] [phone]           Please do not take any of the following 24 hours prior to the day of your exam: vitamins, calcium tablets, antacids.  If possible, please wear clothes without metal (snaps, zippers). A sweatsuit works well.            Sep 11, 2018  2:00 PM CDT   (Arrive by 1:45 PM)   Signature Base Assessment with Darwin Murcia MD   [facility] ([facility])    [address] [place] [address]  5th [facility] [id number] [phone]              Who to contact     Please call your clinic at [phone] to:    Ask questions about your health    Make or cancel appointments    Discuss your medicines    Learn about your test results    Speak to your doctor            Additional Information About Your Visit        MyChart Information      [employer] is an electronic gateway that provides easy, online access to your medical records. With [employer], you can request a clinic appointment, read your test results, renew a prescription or communicate with your care team.     To sign up for [employer] visit the website at [email or web address]   You will be asked to enter the access code listed below, as well as some personal information. Please follow the directions to create your username and password.     Your access code is: [email or web address]  Expires: 2018 10:39 AM     Your access code will  in 90 days. If you need help or a new code, please contact your [facility] or call [phone] for assistance.        Care EveryWhere ID     This is your Care EveryWhere ID. This could be used by other organizations to access your [place] medical records  [id number]         Blood Pressure from Last 3 Encounters:   No data found for BP    Weight from Last 3 Encounters:   No data found for Wt              We Performed the Following     AUDIOGRAM/TYMPANOGRAM - INTERFACE     Cmpn Audiometry Thrshld Eval & Speech Recog (39787)     Tymps / Reflex   (32868)        Primary Care Provider    None Specified       No primary provider on file.        Equal Access to Services     ANGEL GAR : Hadii kavin recinoso Soni, waaxda luqadaha, qaybta kaalmada adeegyada, calos so. So [facility] [phone].    ATENCIÓN: Si habla español, tiene a larkin disposición servicios gratuitos de asistencia lingüística. Llame al [phone].    We comply with applicable federal civil rights laws and [place] laws. We do not discriminate on the basis of race, color, national origin, age, disability, sex, sexual orientation, or gender identity.            Thank you!     Thank you for choosing [facility] AUDIOLOGY  for your care. Our goal is always to provide you with excellent care. Hearing back from our patients is one way we  can continue to improve our services. Please take a few minutes to complete the written survey that you may receive in the mail after your visit with us. Thank you!             Your Updated Medication List - Protect others around you: Learn how to safely use, store and throw away your medicines at www.disposemymeds.org.          This list is accurate as of 9/11/18 11:16 AM.  Always use your most recent med list.                   Brand Name Dispense Instructions for use Diagnosis    * MULTIVITAL PO      Take by mouth daily        * MULTIVITAMIN & MINERAL PO      Take by mouth daily        NEXIUM PO      Take by mouth daily        OMEGA 3 PO      Take by mouth daily        PROBIOTIC FORMULA PO           * Notice:  This list has 2 medication(s) that are the same as other medications prescribed for you. Read the directions carefully, and ask your doctor or other care provider to review them with you.

## 2018-09-11 NOTE — MR AVS SNAPSHOT
After Visit Summary   9/11/2018    Jj Breen    MRN: [id number]           Patient Information     Date Of Birth          1975        Visit Information        Provider Department      9/11/2018 8:00 AM Jojo Vasquez MD [facility] Ophthalmology        Today's Diagnoses     History of photorefractive keratectomy    -  1       Follow-ups after your visit        Follow-up notes from your care team     Return in about 1 year (around 9/11/2019).      Your next 10 appointments already scheduled     Sep 11, 2018  9:00 AM CDT   (Arrive by 8:45 AM)   Fitness Evaluation with Gilmar Garcia, PhD   [facility] ([facility])    [address] [place] [address]  5th [facility] [phone]   [phone]            Sep 11, 2018 10:00 AM CDT   PFT VISIT with RAVINDRA MOLSEY   [facility] Pulmonary Function Testing ([facility])    [address] [place] [address]  3rd [facility] [id number] [phone]            Sep 11, 2018 10:30 AM CDT   (Arrive by 10:15 AM)   Signature Base Assessment with Tom Damon   [facility] Audiology ([facility])    [address] [place] [address]  4th [facility] [id number] [phone]            Sep 11, 2018 11:30 AM CDT   (Arrive by 11:15 AM)   New Nutrition with Kelly Temple RD   [facility] Gastroenterology and IBD Clinic ([facility])    [address] [place] [address]  [address] [facility] [address]   [phone]            Sep 11, 2018  1:00 PM CDT   (Arrive by 12:45 PM)   Signature Base Assessment with Isabel Duffy PA-C   [facility] Dermatology ([facility])    [address] [place] [address]  3rd [facility] [address]   [phone]            Sep 11, 2018  1:30 PM CDT   DX HIP/PELVIS/SPINE with UCDX1   [facility] Imaging Center Dexa ([facility])    [address] [place] [address]  1st [facility]  [id number] [phone]           Please do not take any of the following 24 hours prior to the day of your exam: vitamins, calcium tablets, antacids.  If possible, please wear clothes without metal (snaps, zippers). A sweatsuit works well.            Sep 11, 2018  2:00 PM CDT   (Arrive by 1:45 PM)   Signature Base Assessment with Darwin Murcia MD   [facility] ([facility])    [address] [place] [address]  5th [facility] [id number] [phone]              Who to contact     Please call your clinic at [phone] to:    Ask questions about your health    Make or cancel appointments    Discuss your medicines    Learn about your test results    Speak to your doctor            Additional Information About Your Visit        MyChart Information     [employer] is an electronic gateway that provides easy, online access to your medical records. With [employer], you can request a clinic appointment, read your test results, renew a prescription or communicate with your care team.     To sign up for [employer] visit the website at [email or web address]   You will be asked to enter the access code listed below, as well as some personal information. Please follow the directions to create your username and password.     Your access code is: [email or web address]  Expires: 2018 10:39 AM     Your access code will  in 90 days. If you need help or a new code, please contact your [facility] or call [phone] for assistance.        Care EveryWhere ID     This is your Care EveryWhere ID. This could be used by other organizations to access your [place] medical records  [id number]         Blood Pressure from Last 3 Encounters:   No data found for BP    Weight from Last 3 Encounters:   No data found for Wt              Today, you had the following     No orders found for display       Primary Care Provider    None Specified       No  primary provider on file.        Equal Access to Services     [facility] CONG : Hadii aad ku haddidiheraclio Gandhi, muriel cleary, eloelmer packermacalos huerta. So [facility] [phone].    ATENCIÓN: Si habla español, tiene a larkin disposición servicios gratuitos de asistencia lingüística. Llame al [phone].    We comply with applicable federal civil rights laws and [place] laws. We do not discriminate on the basis of race, color, national origin, age, disability, sex, sexual orientation, or gender identity.            Thank you!     Thank you for choosing [facility] OPHTHALMOLOGY  for your care. Our goal is always to provide you with excellent care. Hearing back from our patients is one way we can continue to improve our services. Please take a few minutes to complete the written survey that you may receive in the mail after your visit with us. Thank you!             Your Updated Medication List - Protect others around you: Learn how to safely use, store and throw away your medicines at www.disposemymeds.org.          This list is accurate as of 9/11/18  8:32 AM.  Always use your most recent med list.                   Brand Name Dispense Instructions for use Diagnosis    * MULTIVITAL PO      Take by mouth daily        * MULTIVITAMIN & MINERAL PO      Take by mouth daily        NEXIUM PO      Take by mouth daily        OMEGA 3 PO      Take by mouth daily        PROBIOTIC FORMULA PO           * Notice:  This list has 2 medication(s) that are the same as other medications prescribed for you. Read the directions carefully, and ask your doctor or other care provider to review them with you.

## 2018-09-11 NOTE — LETTER
9/11/2018       RE: Jj Breen  [address] [facility] [address]     Dear Colleague,    Thank you for referring your patient, Jj Breen, to the [facility]ALTH [facility] WELLNESS [facility]. Please see a copy of my visit note below.    Physical Fitness Assessment:    See Fitness Action Plan for information. I will follow up with Jj about resistance training exercises.    Gilmar Garcia, PhD  Exercise Physiologist  Fitness     Again, thank you for allowing me to participate in the care of your patient.      Sincerely,    Gilmar Garcia, PhD

## 2018-09-11 NOTE — PROGRESS NOTES
" History and Physical Examination     SUBJECTIVE: Chief complaint: preventive health review.     Past Medical History:  1.  Allergic rhinitis  2.  GERD, with reported history of Vazquez's, resolved on subsequent EGDs  3.  Dyslipidemia  4.  Status post bilateral PRK eye surgery, 3/2011  5.  Status post right knee arthroscopic partial meniscectomy, 2013  6.  Status post left knee arthroscopic partial meniscectomy, 2015  7.  Status post right knee synthetic cartilage implant, 2017  8.  Intermittent lower back pain, with history of L5 disc \"bulge\" on MRI     Adverse Drug Reactions: None.     Current Medications:  Fluticasone, 1 spray to each nostril daily as needed  Omega-3 supplement, one capsule daily  Daily multivitamin supplement  Esomeprazole, 40 mg daily  Cetirizine, 10 mg daily as needed    Habits:  Tobacco: Never  Alcohol: None  Caffeine: 1 serving of Advocare (caffeine plus vitamin supplement) daily     Social History:  to Sarah and father of 2 daughters: Rubi, age 16, a physically active high school sophomore who enjoys motocross and downhill skiing, and Ramila, age 14, an  who enjoys volleyball and downhill skiing.  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  He enjoys family activities, and historically enjoyed biking, downhill skiing, walking, camping and hiking; since his knee surgery approximately one year ago, his activity levels have dropped significantly, due in part to a busy work travel schedule.  Currently he walks for 20 minutes, twice per week.    Family History: Mother is 68, with stable multiple sclerosis, diagnosed in her 30s.  Father is 68, with history of testicular cancer, successfully treated in his 30s, and prostate cancer, diagnosed in his early 60s.  A brother is in good health at age 45.  Daughters are in good health.  Maternal grandmother  in her " "80s, presumably from an embolic stroke related to atrial fibrillation; she was a breast cancer survivor.  Maternal grandfather  in his 80s from lung and stomach cancer, with history of extensive tobacco use.  Paternal grandmother  in her 80s from lung cancer, with history of extensive secondhand smoke exposure.  Paternal grandfather  in his 60s from tobacco-related lung cancer.     Review of Systems: Persistent right knee pain, slowly improving over the past year following cartilage implant procedure.  Recent right greater than left shoulder discomfort, initially after beginning an exercise regimen that included shoulder \"dips\"; symptoms currently do not limit his activity.  Intermittent lower back and right lower extremity discomfort, recently described as right groin paresthesias, reviewed by prolonged periods of standing or sitting and resolved with change in position.  Symptoms seemed to improve with chiropractic maneuvers.  He denies unexplained fever, weight loss, leg weakness, difficulty with urination, and unrelenting nocturnal pain.  Known history of L5 disc \"bulge\".  Long-standing mild transient lightheadedness exclusively after rising quickly from bed; no history of palpitations, syncope, or associated symptoms.  Mild to moderate work-related stress, managed conservatively using techniques learned in a recent week-long work seminar. Rare episodes of heartburn, exclusively related to dietary indiscretion.  He reports that an endoscopy in spring 2017 was\" negative\"; 5-year follow-up was recommended at that time.  Most recent tetanus (Tdap) was administered in 2009.  No history of colonoscopy.  Remainder of complete review of systems was negative.    OBJECTIVE:     Vital signs: Height 74.25 inches.  Weight 182.5 pounds.  Blood pressure 111/75 on average of 3 automated readings.  Heart rate 71.  Respiratory rate 16.  Temperature 97.5 degrees.  O2 saturation 98% on room air.  General: Alert, " neatly dressed and groomed, in no acute distress.  HEENT: Atraumatic and normocephalic. Eyelids, pupils, and conjunctivae appeared normal. Lips, teeth and gums appear normal.  Oropharynx showed moist mucous membranes, without exudate or erythema.  Neck: Supple, without thyromegaly, mass, or bruit. No cervical or supraclavicular lymphadenopathy.  Back: No spinal or costovertebral angle tenderness.  SLR was negative to 60  bilaterally.  Lower extremity sensory and motor examinations were normal.  Heel and toe walks were performed without difficulty.  Chest: Clear to auscultation and percussion. Normal respiratory effort.  Cardiovascular: No jugular venous distention. Regular rate and rhythm, normal S1, S2 without murmur.  Abdomen: Bowel sounds positive; soft, nontender, without rebound, guarding, hepatosplenomegaly or mass.  Extremities: No cyanosis or edema.  Examination of the left shoulder was normal.  Examination of the right shoulder showed normal range of motion, with no clavicle, AC joint, or bicipital groove tenderness.  Full active abduction performed without pain.  Pain was elicited in the region of the supraspinatus tendon with external rotation and adduction.  Normal rotator cuff strength.  Genitalia: Normal male genitalia, without scrotal mass or hernia. No inguinal lymphadenopathy.  Rectal: Normal tone, with smooth, nontender, minimally enlarged prostate. No rectal mass.  Skin: Examination was deferred; full evaluation was completed earlier in day through dermatology clinic.  Neurologic: Cranial nerves II-XII were grossly intact. Sensory and motor examinations were normal. Normal gait.  Mini-Cog score was 5/5.  Psychiatric: Alert and oriented ×3. Normal affect. Judgment and insight intact.    Creatinine 1.01, calcium 8.8, alkaline phosphatase 91, ALT 22, cholesterol 153, HDL 35, LDL 90, triglycerides 139, cholesterol/HDL 4.3, PSA 0.96, TSH 1.23, 25-hydroxy vitamin D 35, glucose 93, white blood cell count  "5400, hemoglobin 15.1, platelets 161,000 HIV nonreactive, urinalysis unremarkable.  Testosterone level pending.    EKG was unremarkable.  Spirometry showed an FEV1 of 4.70, with an FVC of 6.07; readings were 108% and 111% of predicted values, respectively.    DEXA showed normal bone density, with most negative and valid Z-score of -0.9 at the level of the right total hip.  Body composition analysis showed 27.0% fat (85th percentile); body mass index was 23.4.     ASSESSMENT:    1.  GERD.  Rare breakthrough symptoms with use of use of esomeprazole.  We reviewed the importance of pursuing further evaluation in the event of progressive symptoms.  I recommended regular GI follow-up even if recommendation is that EGD will not be due until 2022.    2.  Lower back pain.  Intermittent lower extremity symptoms, typically triggered by prolonged periods of standing or sitting.  He denies \"warning symptoms\", which we reviewed in detail.  Symptoms have been stable and have not interfered with usual activities.  He agrees to pursue further evaluation in the event of progressive discomfort, or if any \"warning symptoms\" are noted.    3.  Shoulder discomfort.  Current left shoulder examination is unremarkable.  Right shoulder examination is suggestive of mild supraspinatus tendinitis.  No evidence of rotator cuff tear or nerve impingement.  He will engage in home range of motion exercises and consider physical therapy evaluation and treatment if symptoms do not subside over the next month.    4.  Dyslipidemia.  HDL has fallen to 35 from previous levels as high as 42.  I suspect that his reduced level of activity have played a role.  We discussed the importance of regular exercise, maintenance of ideal weight, and the avoidance of \"trans-\" fats and inclusion of monounsaturated and omega-3 fats in his diet.  Current estimated 10-year risk of a vascular event is 1.2% using AHA/ACC algorithm from 2013 guidelines.    5.  Preventive care. " " Tetanus (Td) vaccination was provided at the time of his appointment, bringing his immunization status up-to-date.  He is practicing recently-acquired stress management techniques with success.  We discussed the potential effect of omega-3 supplements on prostate tissue.  I recommended daily use of a 2000-international unit vitamin D3 supplement, along with daily calcium intake of 1200 mg, preferably from dietary sources.  He was advised to use only a product that does not contain iron should he choose to continue using a multivitamin supplement.  We discussed the importance of additional activity and exercise and strategies to incorporate activity into his disease schedule.  We reviewed the elements of a healthful diet, focusing on avoiding sugars and starches, saturated and \"trans-\" fats, while increasing his intake of monounsaturated and omega-3 fats.  He was advised to maintain ideal weight, while having to increase muscle mass and reduce body fat percentage.    PLAN: See above.     Please note that the above medical document was created with use of speech recognition software and may contain typographical errors.    "

## 2018-09-11 NOTE — LETTER
Date:September 12, 2018      Patient was self referred, no letter generated. Do not send.        [facility] Information

## 2018-09-11 NOTE — MR AVS SNAPSHOT
After Visit Summary   9/11/2018    Jj Breen    MRN: [id number]           Patient Information     Date Of Birth          1975        Visit Information        Provider Department      9/11/2018 9:00 AM Gilmar Garcia, PhD [facility]        Today's Diagnoses     Exercise counseling    -  1       Follow-ups after your visit        Your next 10 appointments already scheduled     Sep 11, 2018 10:30 AM CDT   (Arrive by 10:15 AM)   Signature Base Assessment with Tom Damon   [facility] Audiology ([facility])    [address] [place] [address]  [address] [facility] [id number] [phone]            Sep 11, 2018 11:30 AM CDT   (Arrive by 11:15 AM)   New Nutrition with Kelly Temple RD   [facility] Gastroenterology and IBD Clinic ([facility])    [address] [place] [address]  [address] [facility] [id number] [phone]            Sep 11, 2018  1:00 PM CDT   (Arrive by 12:45 PM)   Signature Base Assessment with Isabel Duffy PA-C   [facility] Dermatology ([facility])    [address] [place] [address]  [address] [facility] [id number] [phone]            Sep 11, 2018  1:30 PM CDT   DX HIP/PELVIS/SPINE with UCDX1   [facility] Imaging [place] [facility] ([facility])    [address] [place] [address]  [address] [facility] [id number] [phone]           Please do not take any of the following 24 hours prior to the day of your exam: vitamins, calcium tablets, antacids.  If possible, please wear clothes without metal (snaps, zippers). A sweatsuit works well.            Sep 11, 2018  2:00 PM CDT   (Arrive by 1:45 PM)   Signature Base Assessment with Darwin Murcia MD   [facility] ([facility])    [address] [place] [address]  [address] [facility] [id number] [phone]              Who to contact     Please call your clinic  at [phone] to:    Ask questions about your health    Make or cancel appointments    Discuss your medicines    Learn about your test results    Speak to your doctor            Additional Information About Your Visit        MyChart Information     LogicTree is an electronic gateway that provides easy, online access to your medical records. With LogicTree, you can request a clinic appointment, read your test results, renew a prescription or communicate with your care team.     To sign up for LogicTree visit the website at [email or web address]   You will be asked to enter the access code listed below, as well as some personal information. Please follow the directions to create your username and password.     Your access code is: [email or web address]  Expires: 2018 10:39 AM     Your access code will  in 90 days. If you need help or a new code, please contact your [facility] or call [phone] for assistance.        Care EveryWhere ID     This is your Care EveryWhere ID. This could be used by other organizations to access your [place] medical records  [id number]         Blood Pressure from Last 3 Encounters:   No data found for BP    Weight from Last 3 Encounters:   No data found for Wt              Today, you had the following     No orders found for display       Primary Care Provider    None Specified       No primary provider on file.        Equal Access to Services     ANGEL GAR : Hadii kavin Gandhi, waaxda luzeeadaha, qaybta kaalmada adelouieyada, calos schilling . So [facility] [phone].    ATENCIÓN: Si habla español, tiene a larkin disposición servicios gratuitos de asistencia lingüística. Llame al [phone].    We comply with applicable federal civil rights laws and [place] laws. We do not discriminate on the basis of race, color, national origin, age, disability, sex, sexual orientation, or gender identity.            Thank you!      Thank you for choosing [facility] AND [facility]  for your care. Our goal is always to provide you with excellent care. Hearing back from our patients is one way we can continue to improve our services. Please take a few minutes to complete the written survey that you may receive in the mail after your visit with us. Thank you!             Your Updated Medication List - Protect others around you: Learn how to safely use, store and throw away your medicines at www.disposemymeds.org.          This list is accurate as of 9/11/18 10:01 AM.  Always use your most recent med list.                   Brand Name Dispense Instructions for use Diagnosis    * MULTIVITAL PO      Take by mouth daily        * MULTIVITAMIN & MINERAL PO      Take by mouth daily        NEXIUM PO      Take by mouth daily        OMEGA 3 PO      Take by mouth daily        PROBIOTIC FORMULA PO           * Notice:  This list has 2 medication(s) that are the same as other medications prescribed for you. Read the directions carefully, and ask your doctor or other care provider to review them with you.

## 2018-09-11 NOTE — NURSING NOTE
Chief Complaints and History of Present Illnesses   Patient presents with     COMPREHENSIVE EYE EXAM     HPI    Affected eye(s):  Both   Symptoms:     No blurred vision   No floaters   No flashes   No tearing   No Dryness         Do you have eye pain now?:  No      Comments:      Germaine Mckeon September 11, 2018 8:05 AM

## 2018-09-11 NOTE — LETTER
9/11/2018    RE: Jj MELANY Breen  [address] [facility] [address]     Physical Fitness Assessment:    See Fitness Action Plan for information. I will follow up with Jj about resistance training exercises.    Gilmar Garcia, PhD  Exercise Physiologist  Fitness     Gilmar Garcia, PhD

## 2018-09-12 LAB — INTERPRETATION ECG - MUSE: NORMAL

## 2018-09-13 LAB — TESTOST SERPL-MCNC: 327 NG/DL (ref 240–950)

## 2019-09-15 NOTE — OUTPATIENT NURSE NOTE
09/11/18 0955   Fitness   Current Fitness Regimen:  5-7k steps per day. Occasional running or ellipticalling.   Fitness Goals Improve cardiorespiratory fitness, get in a pool/learn how to swim, incorporate resistance training into fitnes regimen.   Timeline   Recommended Activity this Week Either average 7,500K steps per day OR hit 10K steps at least three times this week.   Recommended Activity this Month 2x/week of 20 minutes of cardio (incline treadmill walk-jog, elliptical, stair climbing, cycling, etc).   Recommended Activity the Nex 3 Months 3x/week of 20-30 minutes of higher intensity cardio exercise. Get in the pool! No seriously! Do it! :)   VO2 Max   VO2MAX:  35.6 ml/kg/min   VO2- max Percentile 20 %   Fitness Level Poor    Strength    Strength (Right):  117.1 ml/kg/min    Strength (Left) 107.2 ml/kg/min     
N/V Pain abdomen

## 2019-10-01 PROBLEM — M54.50 LOW BACK PAIN: Status: ACTIVE | Noted: 2018-09-11

## 2022-04-21 ENCOUNTER — APPOINTMENT (RX ONLY)
Dept: URBAN - METROPOLITAN AREA CLINIC 299 | Facility: CLINIC | Age: 47
Setting detail: DERMATOLOGY
End: 2022-04-21

## 2022-04-21 DIAGNOSIS — L91.8 OTHER HYPERTROPHIC DISORDERS OF THE SKIN: ICD-10-CM

## 2022-04-21 DIAGNOSIS — D18.0 HEMANGIOMA: ICD-10-CM

## 2022-04-21 DIAGNOSIS — L82.1 OTHER SEBORRHEIC KERATOSIS: ICD-10-CM

## 2022-04-21 DIAGNOSIS — L81.4 OTHER MELANIN HYPERPIGMENTATION: ICD-10-CM

## 2022-04-21 DIAGNOSIS — D22 MELANOCYTIC NEVI: ICD-10-CM

## 2022-04-21 PROBLEM — D22.5 MELANOCYTIC NEVI OF TRUNK: Status: ACTIVE | Noted: 2022-04-21

## 2022-04-21 PROBLEM — D18.01 HEMANGIOMA OF SKIN AND SUBCUTANEOUS TISSUE: Status: ACTIVE | Noted: 2022-04-21

## 2022-04-21 PROCEDURE — ? FULL BODY SKIN EXAM

## 2022-04-21 PROCEDURE — 99203 OFFICE O/P NEW LOW 30 MIN: CPT

## 2022-04-21 PROCEDURE — ? COUNSELING

## 2022-04-21 ASSESSMENT — LOCATION DETAILED DESCRIPTION DERM
LOCATION DETAILED: RIGHT INFERIOR MEDIAL MIDBACK
LOCATION DETAILED: RIGHT MEDIAL SUPERIOR CHEST
LOCATION DETAILED: LEFT INFERIOR UPPER BACK
LOCATION DETAILED: RIGHT AXILLARY VAULT
LOCATION DETAILED: SUPERIOR THORACIC SPINE

## 2022-04-21 ASSESSMENT — LOCATION SIMPLE DESCRIPTION DERM
LOCATION SIMPLE: LEFT UPPER BACK
LOCATION SIMPLE: CHEST
LOCATION SIMPLE: RIGHT AXILLARY VAULT
LOCATION SIMPLE: UPPER BACK
LOCATION SIMPLE: RIGHT LOWER BACK

## 2022-04-21 ASSESSMENT — LOCATION ZONE DERM
LOCATION ZONE: TRUNK
LOCATION ZONE: AXILLAE

## 2022-04-21 NOTE — PROCEDURE: MIPS QUALITY
Quality 110: Preventive Care And Screening: Influenza Immunization: Influenza Immunization previously received during influenza season
Quality 128: Preventive Care And Screening: Body Mass Index (Bmi) Screening And Follow-Up Plan: BMI not documented, reason not otherwise specified.
Detail Level: Detailed
Quality 226: Preventive Care And Screening: Tobacco Use: Screening And Cessation Intervention: Patient screened for tobacco use and is an ex/non-smoker
Quality 130: Documentation Of Current Medications In The Medical Record: Current Medications Documented
Quality 431: Preventive Care And Screening: Unhealthy Alcohol Use - Screening: Patient not identified as an unhealthy alcohol user when screened for unhealthy alcohol use using a systematic screening method
Quality 431: Preventive Care And Screening: Unhealthy Alcohol Use - Screening: Patient screened for unhealthy alcohol use using a single question and scores less than 2 times per year

## 2022-04-21 NOTE — HPI: EVALUATION OF SKIN LESION(S)
Hpi Title: Evaluation of Skin Lesions
Additional History: TBSE. dark lesions to back x years.\\nFlesh toned lesions under arms x years.\\nNo personal or family Hx.

## 2023-01-12 ENCOUNTER — APPOINTMENT (RX ONLY)
Dept: URBAN - METROPOLITAN AREA CLINIC 299 | Facility: CLINIC | Age: 48
Setting detail: DERMATOLOGY
End: 2023-01-12

## 2023-01-12 DIAGNOSIS — L91.8 OTHER HYPERTROPHIC DISORDERS OF THE SKIN: ICD-10-CM

## 2023-01-12 PROCEDURE — ? SKIN TAG REMOVAL

## 2023-01-12 PROCEDURE — 11200 RMVL SKIN TAGS UP TO&INC 15: CPT

## 2023-01-12 PROCEDURE — ? COUNSELING

## 2023-01-12 ASSESSMENT — LOCATION DETAILED DESCRIPTION DERM
LOCATION DETAILED: LEFT POSTERIOR AXILLA
LOCATION DETAILED: RIGHT AXILLARY VAULT
LOCATION DETAILED: RIGHT POSTERIOR AXILLA
LOCATION DETAILED: LEFT ANTERIOR MEDIAL PROXIMAL UPPER ARM
LOCATION DETAILED: LEFT AXILLARY VAULT

## 2023-01-12 ASSESSMENT — LOCATION SIMPLE DESCRIPTION DERM
LOCATION SIMPLE: RIGHT AXILLARY VAULT
LOCATION SIMPLE: RIGHT POSTERIOR AXILLA
LOCATION SIMPLE: LEFT AXILLARY VAULT
LOCATION SIMPLE: LEFT POSTERIOR AXILLA
LOCATION SIMPLE: LEFT UPPER ARM

## 2023-01-12 ASSESSMENT — LOCATION ZONE DERM
LOCATION ZONE: ARM
LOCATION ZONE: AXILLAE

## 2023-01-12 NOTE — PROCEDURE: SKIN TAG REMOVAL
Include Z78.9 (Other Specified Conditions Influencing Health Status) As An Associated Diagnosis?: No
Detail Level: Detailed
Consent: Written consent obtained and the risks of skin tag removal was reviewed with the patient including but not limited to bleeding, pigmentary change, infection, pain, and remote possibility of scarring.
Medical Necessity Information: It is in your best interest to select a reason for this procedure from the list below. All of these items fulfill various CMS LCD requirements except the new and changing color options.
Medical Necessity Clause: This procedure was medically necessary because the lesions that were treated were:
Add Associated Diagnoses If Applicable When Selecting Medical Necessity: Yes

## 2023-01-12 NOTE — PROCEDURE: MIPS QUALITY
Quality 110: Preventive Care And Screening: Influenza Immunization: Influenza Immunization previously received during influenza season
Quality 128: Preventive Care And Screening: Body Mass Index (Bmi) Screening And Follow-Up Plan: BMI not documented, reason not otherwise specified.
Detail Level: Detailed
Quality 226: Preventive Care And Screening: Tobacco Use: Screening And Cessation Intervention: Patient screened for tobacco use and is an ex/non-smoker
Quality 130: Documentation Of Current Medications In The Medical Record: Current Medications Documented
Quality 431: Preventive Care And Screening: Unhealthy Alcohol Use - Screening: Patient not identified as an unhealthy alcohol user when screened for unhealthy alcohol use using a systematic screening method